# Patient Record
Sex: FEMALE | Race: WHITE | NOT HISPANIC OR LATINO | Employment: UNEMPLOYED | URBAN - METROPOLITAN AREA
[De-identification: names, ages, dates, MRNs, and addresses within clinical notes are randomized per-mention and may not be internally consistent; named-entity substitution may affect disease eponyms.]

---

## 2017-01-05 ENCOUNTER — OFFICE VISIT (OUTPATIENT)
Dept: CARDIOLOGY | Facility: CLINIC | Age: 57
End: 2017-01-05

## 2017-01-05 VITALS
WEIGHT: 250 LBS | DIASTOLIC BLOOD PRESSURE: 68 MMHG | HEIGHT: 66 IN | BODY MASS INDEX: 40.18 KG/M2 | HEART RATE: 72 BPM | SYSTOLIC BLOOD PRESSURE: 124 MMHG

## 2017-01-05 DIAGNOSIS — I10 ESSENTIAL HYPERTENSION: Primary | ICD-10-CM

## 2017-01-05 PROCEDURE — 99213 OFFICE O/P EST LOW 20 MIN: CPT | Performed by: INTERNAL MEDICINE

## 2017-01-05 PROCEDURE — 93000 ELECTROCARDIOGRAM COMPLETE: CPT | Performed by: INTERNAL MEDICINE

## 2017-01-05 NOTE — PROGRESS NOTES
" Subjective:       Liliya Robison is a 56 y.o. female who here for follow up    CC  Surg. Clearance for hand surgery  HPI  Pt with kn essntial arterial HTN here for surg. Clearance, denies cp, palpitation, syncope near syncope     Problem List Items Addressed This Visit        Cardiovascular and Mediastinum    Hypertension - Primary    Relevant Orders    ECG 12 Lead        Previous treatments/evaluations include: ASA and beta blocker. Cardiac risk factors: advanced age (older than 55 for men, 65 for women).    The following portions of the patient's history were reviewed and updated as appropriate: allergies, current medications, past family history, past medical history, past social history, past surgical history and problem list.    Past Medical History   Diagnosis Date   • Hyperlipidemia    • Hypertension    • NAFLD (nonalcoholic fatty liver disease)    • Obstructive sleep apnea on CPAP    • Type 2 diabetes mellitus     reports that she has quit smoking. She smoked 2.00 packs per day. She does not have any smokeless tobacco history on file. She reports that she does not drink alcohol.  Family History   Problem Relation Age of Onset   • Hypertension Mother    • Atrial fibrillation Father    • Kidney disease Father    • Hypertension Father    • Kidney disease Sister    • Hyperlipidemia Sister    • Hypertension Sister    • Hypertension Brother    • Hyperlipidemia Brother    • Diabetes Brother        Review of Systems  Constitutional: No wt loss, fever, fatigue  Gastrointestinal: No nausea, abdominal pain  Behavioral/Psych: No insomnia or anxiety   Cardiovascular no cp  Objective:       Physical Exam             Physical Exam  Visit Vitals   • /68   • Pulse 72   • Ht 66\" (167.6 cm)   • Wt 250 lb (113 kg)   • BMI 40.35 kg/m2     General appearance: alert, appears stated age and cooperative, oriented x 3  Neck: no JVD and supple, symmetrical, trachea midline  Lungs: clear to auscultation " bilaterally  Heart:Normal PMI,  S1, S2 normal, no murmur, rub or gallop  Extremities: normal range of motion, no cyanosis or edema,  Pulses: 2+ and symmetric  Skin: Skin color, texture, turgor normal. No rashes or lesions  Psych:  Pleasant and cooperative        Cardiographics  @  ECG 12 Lead  Date/Time: 1/5/2017 1:59 PM  Performed by: BROWN BLACKMAN  Authorized by: BROWN BLACKMAN   Comparison: compared with previous ECG   Similar to previous ECG  Rhythm: sinus rhythm  Clinical impression: non-specific ECG        · Findings consistent with a normal ECG stress test.  · Breast attenuation artifact is present.  · Left ventricular ejection fraction is normal (Calculated EF = 65%).  · Myocardial perfusion imaging indicates a normal myocardial perfusion study with no evidence of ischemia.  Impressions are consistent with a low risk study.        Echocardiogram:    · All left ventricular wall segments contract normally.  · Mild mitral valve regurgitation is present  · Left Ventricle: Calculated EF = 67.2%  · There is no evidence of pericardial effusion  · Mild mitral valve regurgitation is present        Current Outpatient Prescriptions:   •  albuterol (PROVENTIL HFA;VENTOLIN HFA) 108 (90 BASE) MCG/ACT inhaler, Inhale 2 puffs Every 6 (Six) Hours As Needed for shortness of air.  2 PUFFS EVERY 4 HOURS AS NEEDED, Disp: 2 inhaler, Rfl: 3  •  aspirin 81 MG EC tablet, Take 81 mg by mouth daily., Disp: , Rfl:   •  aspirin-acetaminophen-caffeine (EXCEDRIN MIGRAINE) 250-250-65 MG per tablet, Take 2 tablets by mouth Daily., Disp: 60 tablet, Rfl: 1  •  bisoprolol-hydrochlorothiazide (ZIAC) 5-6.25 MG per tablet, Take 1 tablet by mouth Daily., Disp: 90 tablet, Rfl: 3  •  buPROPion XL (WELLBUTRIN XL) 150 MG 24 hr tablet, Take 1 tablet by mouth Daily., Disp: 90 tablet, Rfl: 3  •  citalopram (CeleXA) 40 MG tablet, Take 1 tablet by mouth Daily., Disp: 30 tablet, Rfl: 0  •  Estradiol 0.75 MG/1.25 GM (0.06%) topical gel, Place   on the skin daily., Disp: , Rfl:   •  ezetimibe (ZETIA) 10 MG tablet, Take 1 tablet by mouth Daily., Disp: 90 tablet, Rfl: 1  •  fluticasone (FLONASE) 50 MCG/ACT nasal spray, 1 spray into each nostril 2 (Two) Times a Day., Disp: , Rfl:   •  furosemide (LASIX) 40 MG tablet, Take 1 tablet by mouth Daily., Disp: 90 tablet, Rfl: 3  •  glucose blood test strip, One touch verio Testing bs 4 x day, Disp: 400 each, Rfl: 2  •  insulin aspart (NOVOLOG FLEXPEN) 100 UNIT/ML solution pen-injector sc pen, IN JECT 35 UNITS IN THE MORNING 50 UNITS AT LUNCH AND 54 UNITS AT DINNER, Disp: 27 pen, Rfl: 2  •  Insulin Degludec 200 UNIT/ML solution pen-injector, Inject 68 Units under the skin 2 (Two) Times a Day., Disp: 9 pen, Rfl: 2  •  Insulin Pen Needle (PEN NEEDLES) 31G X 5 MM misc, 1 each 5 (Five) Times a Day., Disp: 500 each, Rfl: 2  •  lisinopril (PRINIVIL,ZESTRIL) 10 MG tablet, Take 1 tablet by mouth Daily., Disp: 90 tablet, Rfl: 3  •  montelukast (SINGULAIR) 10 MG tablet, Take 1 tablet by mouth Every Night., Disp: 30 tablet, Rfl: 11  •  Multiple Vitamins-Minerals (ALIVE ONCE DAILY WOMENS 50+ PO), Take 1 tablet/day by mouth daily., Disp: , Rfl:   •  naproxen (NAPROSYN) 500 MG tablet, Take 500 mg by mouth., Disp: , Rfl:   •  omeprazole (priLOSEC) 40 MG capsule, Take 1 capsule by mouth Daily., Disp: 90 capsule, Rfl: 3  •  ONETOUCH DELICA LANCETS 33G misc, USING 3 DAILY, Disp: 300 each, Rfl: 1  •  PHARMACIST CHOICE LANCETS misc, , Disp: , Rfl:   •  potassium chloride (KLOR-CON) 10 MEQ CR tablet, Take 1 tablet by mouth 2 (Two) Times a Day., Disp: 60 tablet, Rfl: 11  •  Probiotic Product (PROBIOTIC DAILY PO), Take 1 capsule by mouth daily., Disp: , Rfl:   •  progesterone (PROMETRIUM) 100 MG capsule, 100 mg. TAKE ONE CAPSULE BY MOUTH AT BEDTIME, Disp: , Rfl: 4  •  promethazine (PHENERGAN) 25 MG tablet, Take 1 tablet by mouth Every 6 (Six) Hours As Needed for nausea or vomiting., Disp: 360 tablet, Rfl: 3   Assessment:        Patient  Active Problem List   Diagnosis   • Hyperlipidemia   • Obstructive sleep apnea on CPAP   • NAFLD (nonalcoholic fatty liver disease)   • Type 2 diabetes mellitus without complication   • History of renal stone   • Cervical radiculopathy due to degenerative joint disease of spine   • Hypertension   • Gastroesophageal reflux disease without esophagitis   • Environmental allergies   • Chronic anxiety   • Chronic depression   • Migraine without status migrainosus, not intractable               Plan:       Clinically no signs and symptoms of angina or chf, no side effects with current meds,.    Continue current meds and treatment.        ICD-10-CM ICD-9-CM   1. Essential hypertension I10 401.9     Liliya Robison seen and examined with no clinical signs of angina or chf, pt is cleared for surgery with non modifiable risk factors.  Liliya Robison has been advised to take cardiac meds with sip of water on the day of surgery.    Please use beta blocker for tachycardia perioperatively    Anticoagulation to be managed appropriately    Watch for chest pain, shortness of breath, palpitations, arrhythmias, and significant change in the blood pressure perioperatively,     Please check EKG preop and postop if any questions, notify us if any change in patient's cardiovascular conditions    COUNSELING:    Liliya RobisonCounseling was given to patient for the following topics: diagnostic results, risk factor reductions, impressions, risks and benefits of treatment options and importance of treatment compliance .       SMOKING COUNSELING:    Counseling given: Not Answered      EMR Dragon/Transcription disclaimer:   Much of this encounter note is an electronic transcription/translation of spoken language to printed text. The electronic translation of spoken language may permit erroneous, or at times, nonsensical words or phrases to be inadvertently transcribed; Although I have reviewed the note for such errors, some may  still exist.

## 2017-01-05 NOTE — MR AVS SNAPSHOT
Liliya MALAVE Brayanalexandre   1/5/2017 1:15 PM   Office Visit    Dept Phone:  [phone]   Encounter #:  [id number]    Provider:  Luisa Grubbs MD   Department:  [facility]                Your Full Care Plan              Your Updated Medication List          This list is accurate as of: 1/5/17  2:18 PM.  Always use your most recent med list.                albuterol 108 (90 BASE) MCG/ACT inhaler   Commonly known as:  PROVENTIL HFA;VENTOLIN HFA   Inhale 2 puffs Every 6 (Six) Hours As Needed for shortness of air.  2 PUFFS EVERY 4 HOURS AS NEEDED       ALIVE ONCE DAILY WOMENS 50+ PO       aspirin 81 MG EC tablet       aspirin-acetaminophen-caffeine 250-250-65 MG per tablet   Commonly known as:  EXCEDRIN MIGRAINE   Take 2 tablets by mouth Daily.       bisoprolol-hydrochlorothiazide 5-6.25 MG per tablet   Commonly known as:  ZIAC   Take 1 tablet by mouth Daily.       buPROPion  MG 24 hr tablet   Commonly known as:  WELLBUTRIN XL   Take 1 tablet by mouth Daily.       citalopram 40 MG tablet   Commonly known as:  CeleXA   Take 1 tablet by mouth Daily.       Estradiol 0.75 MG/1.25 GM (0.06%) topical gel       ezetimibe 10 MG tablet   Commonly known as:  ZETIA   Take 1 tablet by mouth Daily.       fluticasone 50 MCG/ACT nasal spray   Commonly known as:  FLONASE       furosemide 40 MG tablet   Commonly known as:  LASIX   Take 1 tablet by mouth Daily.       glucose blood test strip   One touch verio Testing bs 4 x day       insulin aspart 100 UNIT/ML solution pen-injector sc pen   Commonly known as:  novoLOG FLEXPEN   IN JECT 35 UNITS IN THE MORNING 50 UNITS AT LUNCH AND 54 UNITS AT DINNER       Insulin Degludec 200 UNIT/ML solution pen-injector   Inject 68 Units under the skin 2 (Two) Times a Day.       lisinopril 10 MG tablet   Commonly known as:  PRINIVIL,ZESTRIL   Take 1 tablet by mouth Daily.       montelukast 10 MG tablet   Commonly known as:   SINGULAIR   Take 1 tablet by mouth Every Night.       naproxen 500 MG tablet   Commonly known as:  NAPROSYN       omeprazole 40 MG capsule   Commonly known as:  priLOSEC   Take 1 capsule by mouth Daily.       Pen Needles 31G X 5 MM misc   1 each 5 (Five) Times a Day.       * PHARMACIST CHOICE LANCETS misc       * ONETOUCH DELICA LANCETS 33G misc   USING 3 DAILY       potassium chloride 10 MEQ CR tablet   Commonly known as:  KLOR-CON   Take 1 tablet by mouth 2 (Two) Times a Day.       PROBIOTIC DAILY PO       progesterone 100 MG capsule   Commonly known as:  PROMETRIUM       promethazine 25 MG tablet   Commonly known as:  PHENERGAN   Take 1 tablet by mouth Every 6 (Six) Hours As Needed for nausea or vomiting.       * Notice:  This list has 2 medication(s) that are the same as other medications prescribed for you. Read the directions carefully, and ask your doctor or other care provider to review them with you.            We Performed the Following     ECG 12 Lead       You Were Diagnosed With        Codes Comments    Essential hypertension    -  Primary ICD-10-CM: I10  ICD-9-CM: 401.9       Instructions     None    Patient Instructions History      Upcoming Appointments     Visit Type Date Time Department    OFFICE VISIT 2017  1:15 PM [facility]    OFFICE VISIT 2017  2:00 PM [facility]    OFFICE VISIT 2017  9:00 AM [facility]      [employer] Signup     [facility] [employer] allows you to send messages to your doctor, view your test results, renew your prescriptions, schedule appointments, and more. To sign up, go to [employer] and click on the Sign Up Now link in the New User? box. Enter your [employer] Activation Code exactly as it appears below along with the last four digits of your Social Security Number and your Date of Birth () to complete the sign-up process. If you do not sign up before the expiration date, you must request a new code.    [employer] Activation  "Code: [email or web address]  Expires: 1/19/2017  9:24 AM    If you have questions, you can email [email or web address] or call [phone] to talk to our MyChart staff. Remember, MyChart is NOT to be used for urgent needs. For medical emergencies, dial 911.               Other Info from Your Visit           Your Appointments     Apr 20, 2017  2:00 PM EDT   Office Visit with Dave Cota MD   [facility] ENDOCRINOLOGY (--)    [address]  [facility] [address] [phone]           Arrive 15 minutes prior to appointment.            Jun 01, 2017  9:00 AM EDT   Office Visit with Davin Meyers Jr., MD   [facility] INTERNAL MEDICINE (--)    [address]  [facility] [address] [phone]           Arrive 15 minutes prior to appointment.              Allergies     Motrin Pm  [Ibuprofen-diphenhydramine Cit]  Other (See Comments)      Reason for Visit     Hypertension           Vital Signs     Blood Pressure Pulse Height Weight Body Mass Index Smoking Status    124/68 72 66\" (167.6 cm) 250 lb (113 kg) 40.35 kg/m2 Former Smoker      Problems and Diagnoses Noted     High blood pressure        "

## 2017-01-18 DIAGNOSIS — F41.9 CHRONIC ANXIETY: Primary | ICD-10-CM

## 2017-01-18 RX ORDER — CITALOPRAM 40 MG/1
TABLET ORAL
Qty: 30 TABLET | Refills: 0 | Status: SHIPPED | OUTPATIENT
Start: 2017-01-18 | End: 2017-02-21 | Stop reason: SDUPTHER

## 2017-02-21 DIAGNOSIS — F41.9 CHRONIC ANXIETY: ICD-10-CM

## 2017-02-21 RX ORDER — CITALOPRAM 40 MG/1
TABLET ORAL
Qty: 30 TABLET | Refills: 4 | Status: SHIPPED | OUTPATIENT
Start: 2017-02-21 | End: 2017-07-20 | Stop reason: SDUPTHER

## 2017-02-22 ENCOUNTER — TELEPHONE (OUTPATIENT)
Dept: INTERNAL MEDICINE | Facility: CLINIC | Age: 57
End: 2017-02-22

## 2017-02-22 NOTE — TELEPHONE ENCOUNTER
----- Message from Akilah Holland sent at 2/21/2017  1:22 PM EST -----  Contact: Patient  Patient called regarding her Rx for     Celexa.  States she needs a 90-day supply called to pharmacy.      Patient:  [phone]    Pharmacy:  [facility]/pharmacy #[id number] - [place], [place] - [address] AT [facility] - [phone]  - [phone] FX

## 2017-04-19 RX ORDER — BISOPROLOL FUMARATE AND HYDROCHLOROTHIAZIDE 5; 6.25 MG/1; MG/1
TABLET ORAL
Qty: 90 TABLET | Refills: 2 | Status: SHIPPED | OUTPATIENT
Start: 2017-04-19 | End: 2017-04-20

## 2017-04-20 ENCOUNTER — TELEPHONE (OUTPATIENT)
Dept: CARDIOLOGY | Facility: CLINIC | Age: 57
End: 2017-04-20

## 2017-04-20 RX ORDER — BISOPROLOL FUMARATE AND HYDROCHLOROTHIAZIDE 5; 6.25 MG/1; MG/1
1 TABLET ORAL DAILY
Qty: 90 TABLET | Refills: 3 | Status: SHIPPED | OUTPATIENT
Start: 2017-04-20 | End: 2018-08-03 | Stop reason: SDUPTHER

## 2017-04-20 NOTE — TELEPHONE ENCOUNTER
----- Message from Gloria Colbert sent at 4/19/2017 10:18 AM EDT -----  Regarding: Prescription Refill  Contact: [phone]  [facility] [phone]  [facility] 5/6.25 QD #90

## 2017-04-21 ENCOUNTER — TELEPHONE (OUTPATIENT)
Dept: INTERNAL MEDICINE | Facility: CLINIC | Age: 57
End: 2017-04-21

## 2017-04-21 DIAGNOSIS — E11.9 TYPE 2 DIABETES MELLITUS WITHOUT COMPLICATION, UNSPECIFIED LONG TERM INSULIN USE STATUS: Primary | ICD-10-CM

## 2017-04-21 NOTE — TELEPHONE ENCOUNTER
----- Message from Michelle Hubbard sent at 4/21/2017 10:27 AM EDT -----  Contact: Pt's  Bereket  Pt's  Bereket called to say they want to change to a new endocrinologist.  Dr. Cota hasn't be managing pt's diabetes well.  Only increasing the insulin not trying to get to root of problem. They would like to see Dr. Ted Edward and need a referral from Dr. Meyers.    DR. TED EDWARD  [address] [place] [address]  PH: [phone]       Pt's [phone] or [phone] cell

## 2017-05-08 ENCOUNTER — APPOINTMENT (OUTPATIENT)
Dept: WOMENS IMAGING | Facility: HOSPITAL | Age: 57
End: 2017-05-08

## 2017-05-08 PROCEDURE — 77067 SCR MAMMO BI INCL CAD: CPT | Performed by: RADIOLOGY

## 2017-06-09 DIAGNOSIS — G47.33 OBSTRUCTIVE SLEEP APNEA ON CPAP: ICD-10-CM

## 2017-06-09 DIAGNOSIS — E78.5 HYPERLIPIDEMIA: ICD-10-CM

## 2017-06-09 DIAGNOSIS — E11.9 TYPE 2 DIABETES MELLITUS WITHOUT COMPLICATION (HCC): ICD-10-CM

## 2017-06-09 DIAGNOSIS — Z99.89 OBSTRUCTIVE SLEEP APNEA ON CPAP: ICD-10-CM

## 2017-06-09 DIAGNOSIS — K76.0 NAFLD (NONALCOHOLIC FATTY LIVER DISEASE): ICD-10-CM

## 2017-06-13 DIAGNOSIS — E78.5 HYPERLIPIDEMIA: ICD-10-CM

## 2017-06-13 DIAGNOSIS — G47.33 OBSTRUCTIVE SLEEP APNEA ON CPAP: ICD-10-CM

## 2017-06-13 DIAGNOSIS — E11.9 TYPE 2 DIABETES MELLITUS WITHOUT COMPLICATION (HCC): ICD-10-CM

## 2017-06-13 DIAGNOSIS — K76.0 NAFLD (NONALCOHOLIC FATTY LIVER DISEASE): ICD-10-CM

## 2017-06-13 DIAGNOSIS — Z99.89 OBSTRUCTIVE SLEEP APNEA ON CPAP: ICD-10-CM

## 2017-07-20 DIAGNOSIS — F41.9 CHRONIC ANXIETY: ICD-10-CM

## 2017-07-20 RX ORDER — CITALOPRAM 40 MG/1
TABLET ORAL
Qty: 30 TABLET | Refills: 4 | Status: SHIPPED | OUTPATIENT
Start: 2017-07-20 | End: 2017-12-23 | Stop reason: SDUPTHER

## 2017-07-28 ENCOUNTER — OFFICE VISIT (OUTPATIENT)
Dept: INTERNAL MEDICINE | Facility: CLINIC | Age: 57
End: 2017-07-28

## 2017-07-28 VITALS
DIASTOLIC BLOOD PRESSURE: 66 MMHG | WEIGHT: 251 LBS | HEART RATE: 70 BPM | BODY MASS INDEX: 40.34 KG/M2 | HEIGHT: 66 IN | SYSTOLIC BLOOD PRESSURE: 110 MMHG

## 2017-07-28 DIAGNOSIS — G43.809 OTHER MIGRAINE WITHOUT STATUS MIGRAINOSUS, NOT INTRACTABLE: ICD-10-CM

## 2017-07-28 DIAGNOSIS — F32.A CHRONIC DEPRESSION: ICD-10-CM

## 2017-07-28 DIAGNOSIS — F41.9 CHRONIC ANXIETY: ICD-10-CM

## 2017-07-28 DIAGNOSIS — I10 ESSENTIAL HYPERTENSION: ICD-10-CM

## 2017-07-28 DIAGNOSIS — E78.5 HYPERLIPIDEMIA, UNSPECIFIED HYPERLIPIDEMIA TYPE: Primary | ICD-10-CM

## 2017-07-28 DIAGNOSIS — K21.9 GASTROESOPHAGEAL REFLUX DISEASE WITHOUT ESOPHAGITIS: ICD-10-CM

## 2017-07-28 DIAGNOSIS — R10.11 RIGHT UPPER QUADRANT ABDOMINAL PAIN: ICD-10-CM

## 2017-07-28 PROCEDURE — 99214 OFFICE O/P EST MOD 30 MIN: CPT | Performed by: INTERNAL MEDICINE

## 2017-07-28 RX ORDER — METFORMIN HYDROCHLORIDE 500 MG/1
TABLET, EXTENDED RELEASE ORAL
COMMUNITY
Start: 2017-07-24

## 2017-07-28 NOTE — PROGRESS NOTES
Subjective   Liliya Robison is a 56 y.o. female.     History of Present Illness she is here today for her annual visit which includes follow-up of hypertension, hypercholesterolemia, migraine headache, neck anxiety and depression, and persistent right upper quadrant pain.  She relates pain on a daily basis on an intermittent basis.  Usually it is brief and it is not associated with eating or defecation or urination.  She does have some low back pain on the right side as well.  It does not awaken her from sleep.  She has chronic nausea without vomiting which at times is worse with this pain.  There has been no change in bowel habit, dysphagia, melanotic stool, or rectal bleeding.    From a respiratory standpoint she has been doing fairly well.  She has occasional wheezing decreased with inhaler use.  She has chronic left-sided migraine on a daily basis although fortunately they have not been disabling.  There are no associated neurologic symptoms.  The remainder of her review systems is negative.        Review of Systems   Constitutional: Negative for activity change, appetite change, fatigue and fever.   HENT: Negative for trouble swallowing.    Respiratory: Positive for wheezing. Negative for cough, chest tightness and shortness of breath.    Cardiovascular: Negative for chest pain, palpitations and leg swelling.   Gastrointestinal: Positive for abdominal pain. Negative for anal bleeding, blood in stool, constipation, diarrhea, nausea and vomiting.   Genitourinary: Negative for difficulty urinating, dysuria, flank pain, frequency and hematuria.   Musculoskeletal: Positive for back pain. Negative for arthralgias and gait problem.   Neurological: Positive for headaches. Negative for dizziness, syncope, facial asymmetry, speech difficulty, weakness and numbness.   Psychiatric/Behavioral: Negative for confusion and dysphoric mood. The patient is not nervous/anxious.        Objective   Physical Exam    Constitutional: She is oriented to person, place, and time. Vital signs are normal. She appears well-developed and well-nourished. She is active. She does not appear ill.   Eyes: Conjunctivae are normal.   Fundoscopic exam:       The right eye shows no AV nicking, no exudate and no hemorrhage.        The left eye shows no AV nicking, no exudate and no hemorrhage.   Neck: Carotid bruit is not present. No thyroid mass and no thyromegaly present.   Cardiovascular: Normal rate, regular rhythm, S1 normal and S2 normal.  Exam reveals no S3 and no S4.    No murmur heard.  Pulses:       Posterior tibial pulses are 2+ on the right side, and 2+ on the left side.   Pulmonary/Chest: No tachypnea. No respiratory distress. She has no decreased breath sounds. She has no wheezes. She has no rhonchi. She has no rales.   Abdominal: Soft. Normal appearance and bowel sounds are normal. She exhibits no abdominal bruit and no mass. There is tenderness in the right upper quadrant.       Vascular Status -  Her exam exhibits no right foot edema. Her exam exhibits no left foot edema.  Neurological: She is alert and oriented to person, place, and time. Gait normal.   Reflex Scores:       Bicep reflexes are 2+ on the right side.  Psychiatric: She has a normal mood and affect. Her speech is normal and behavior is normal. Judgment and thought content normal. Cognition and memory are normal.       Assessment/Plan December 2016 lab showed a hemoglobin A1c of 9.6.  CMP was normal with exception of an elevated glucose.  Urinalysis was normal.  CBC showed a platelet count of 134,000.  Total cholesterol 193 triglycerides 153 HDL cholesteryl 41 LDL cholesterol 121    Assessment #1 hypertension-good control #2 chronic anxiety and depression-nicely compensated at present #3 hypercholesterolemia-fair control and without sign of target organ injury #4 right upper quadrant pain-question related to gallbladder disease.  She did have a normal abdominal  ultrasound in 2014.    Plan #1 no change medication #2 abdominal ultrasound #3 CBC, CMP, urinalysis.  Routine follow-up with me in 6 months.

## 2017-07-31 RX ORDER — EZETIMIBE 10 MG/1
TABLET ORAL
Qty: 90 TABLET | Refills: 1 | Status: SHIPPED | OUTPATIENT
Start: 2017-07-31 | End: 2018-01-26 | Stop reason: SDUPTHER

## 2017-08-11 ENCOUNTER — HOSPITAL ENCOUNTER (OUTPATIENT)
Dept: ULTRASOUND IMAGING | Facility: HOSPITAL | Age: 57
Discharge: HOME | End: 2017-08-11
Attending: INTERNAL MEDICINE | Admitting: INTERNAL MEDICINE

## 2017-08-11 PROCEDURE — 76700 US EXAM ABDOM COMPLETE: CPT

## 2017-08-16 ENCOUNTER — TELEPHONE (OUTPATIENT)
Dept: INTERNAL MEDICINE | Facility: CLINIC | Age: 57
End: 2017-08-16

## 2017-08-16 NOTE — TELEPHONE ENCOUNTER
----- Message from Jigna Jarrett MA sent at 8/16/2017  1:27 PM EDT -----  Dr Meyers Pt    Pt calling for US results-very anxious    Pt#[phone]

## 2017-08-16 NOTE — TELEPHONE ENCOUNTER
US showed extra fat deposits in the liver, previously seen on the US that she had in 2015. Nothing to explain the pain in the right side. AS for the fatty liver, weight loss will be helpful.

## 2017-10-20 RX ORDER — MONTELUKAST SODIUM 10 MG/1
TABLET ORAL
Qty: 30 TABLET | Refills: 4 | Status: SHIPPED | OUTPATIENT
Start: 2017-10-20 | End: 2018-03-27 | Stop reason: SDUPTHER

## 2017-11-29 ENCOUNTER — APPOINTMENT (OUTPATIENT)
Dept: GENERAL RADIOLOGY | Facility: HOSPITAL | Age: 57
End: 2017-11-29

## 2017-11-29 ENCOUNTER — HOSPITAL ENCOUNTER (EMERGENCY)
Facility: HOSPITAL | Age: 57
Discharge: HOME | End: 2017-11-29
Attending: FAMILY MEDICINE | Admitting: FAMILY MEDICINE

## 2017-11-29 VITALS
BODY MASS INDEX: 38.57 KG/M2 | DIASTOLIC BLOOD PRESSURE: 67 MMHG | RESPIRATION RATE: 18 BRPM | HEIGHT: 66 IN | TEMPERATURE: 98.7 F | OXYGEN SATURATION: 94 % | WEIGHT: 240 LBS | SYSTOLIC BLOOD PRESSURE: 131 MMHG | HEART RATE: 76 BPM
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
## 2017-11-29 DIAGNOSIS — M25.531 BILATERAL WRIST PAIN: Primary | ICD-10-CM

## 2017-11-29 DIAGNOSIS — S16.1XXA CERVICAL STRAIN, ACUTE, INITIAL ENCOUNTER: ICD-10-CM

## 2017-11-29 DIAGNOSIS — S80.01XA CONTUSION OF RIGHT KNEE, INITIAL ENCOUNTER: ICD-10-CM

## 2017-11-29 DIAGNOSIS — S80.02XA CONTUSION OF LEFT KNEE, INITIAL ENCOUNTER: ICD-10-CM

## 2017-11-29 DIAGNOSIS — M25.532 BILATERAL WRIST PAIN: Primary | ICD-10-CM
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
## 2017-11-29 PROCEDURE — 73110 X-RAY EXAM OF WRIST: CPT

## 2017-11-29 PROCEDURE — 99284 EMERGENCY DEPT VISIT MOD MDM: CPT

## 2017-11-29 PROCEDURE — 99283 EMERGENCY DEPT VISIT LOW MDM: CPT

## 2017-11-29 PROCEDURE — 73560 X-RAY EXAM OF KNEE 1 OR 2: CPT

## 2017-11-29 RX ORDER — CYCLOBENZAPRINE HCL 10 MG
10 TABLET ORAL 3 TIMES DAILY PRN
Qty: 20 TABLET | Refills: 0 | Status: SHIPPED | OUTPATIENT
Start: 2017-11-29

## 2017-11-29 RX ORDER — ONDANSETRON 4 MG/1
4 TABLET, ORALLY DISINTEGRATING ORAL ONCE
Status: COMPLETED | OUTPATIENT
Start: 2017-11-29 | End: 2017-11-29

## 2017-11-29 RX ORDER — ONDANSETRON 2 MG/ML
4 INJECTION INTRAMUSCULAR; INTRAVENOUS ONCE
Status: DISCONTINUED | OUTPATIENT
Start: 2017-11-29 | End: 2017-11-29

## 2017-11-29 RX ORDER — NAPROXEN 500 MG/1
500 TABLET ORAL 2 TIMES DAILY WITH MEALS
Qty: 30 TABLET | Refills: 0 | Status: SHIPPED | OUTPATIENT
Start: 2017-11-29

## 2017-11-29 RX ORDER — HYDROCODONE BITARTRATE AND ACETAMINOPHEN 7.5; 325 MG/1; MG/1
1 TABLET ORAL ONCE
Status: COMPLETED | OUTPATIENT
Start: 2017-11-29 | End: 2017-11-29

## 2017-11-29 RX ADMIN — HYDROCODONE BITARTRATE AND ACETAMINOPHEN 1 TABLET: 7.5; 325 TABLET ORAL at 18:40

## 2017-11-29 RX ADMIN — ONDANSETRON 4 MG: 4 TABLET, ORALLY DISINTEGRATING ORAL at 18:40

## 2017-12-19 RX ORDER — FUROSEMIDE 40 MG/1
TABLET ORAL
Qty: 90 TABLET | Refills: 3 | Status: SHIPPED | OUTPATIENT
Start: 2017-12-19

## 2017-12-19 RX ORDER — OMEPRAZOLE 40 MG/1
CAPSULE, DELAYED RELEASE ORAL
Qty: 90 CAPSULE | Refills: 3 | Status: SHIPPED | OUTPATIENT
Start: 2017-12-19 | End: 2019-08-20 | Stop reason: ALTCHOICE

## 2017-12-23 DIAGNOSIS — F41.9 CHRONIC ANXIETY: ICD-10-CM

## 2017-12-26 RX ORDER — CITALOPRAM 40 MG/1
TABLET ORAL
Qty: 90 TABLET | Refills: 3 | Status: SHIPPED | OUTPATIENT
Start: 2017-12-26

## 2017-12-29 ENCOUNTER — OFFICE VISIT (OUTPATIENT)
Dept: INTERNAL MEDICINE | Facility: CLINIC | Age: 57
End: 2017-12-29

## 2017-12-29 ENCOUNTER — TELEPHONE (OUTPATIENT)
Dept: INTERNAL MEDICINE | Facility: CLINIC | Age: 57
End: 2017-12-29

## 2017-12-29 VITALS
HEART RATE: 70 BPM | BODY MASS INDEX: 40.34 KG/M2 | DIASTOLIC BLOOD PRESSURE: 74 MMHG | OXYGEN SATURATION: 97 % | HEIGHT: 66 IN | WEIGHT: 251 LBS | SYSTOLIC BLOOD PRESSURE: 120 MMHG

## 2017-12-29 DIAGNOSIS — J20.8 ACUTE BRONCHITIS DUE TO OTHER SPECIFIED ORGANISMS: Primary | ICD-10-CM

## 2017-12-29 PROBLEM — R10.11 RIGHT UPPER QUADRANT ABDOMINAL PAIN: Status: RESOLVED | Noted: 2017-07-28 | Resolved: 2017-12-29

## 2017-12-29 PROCEDURE — 99212 OFFICE O/P EST SF 10 MIN: CPT | Performed by: INTERNAL MEDICINE

## 2017-12-29 RX ORDER — DEXTROMETHORPHAN HYDROBROMIDE AND PROMETHAZINE HYDROCHLORIDE 15; 6.25 MG/5ML; MG/5ML
5 SYRUP ORAL EVERY 4 HOURS
Qty: 240 ML | Refills: 1 | Status: SHIPPED | OUTPATIENT
Start: 2017-12-29 | End: 2018-05-23

## 2017-12-29 RX ORDER — AZITHROMYCIN 250 MG/1
TABLET, FILM COATED ORAL
Qty: 6 TABLET | Refills: 0 | Status: SHIPPED | OUTPATIENT
Start: 2017-12-29 | End: 2018-05-23

## 2017-12-29 NOTE — PROGRESS NOTES
Subjective   Liliya Robison is a 57 y.o. female.     History of Present Illness she is here today with a week history of significant cough day and night with green gray and yellow sputum production.  Occasionally she has had some wheezing and dyspnea.  She has had some rhinorrhea and hoarseness.  She denies sore throat fever sweats or chills.  She does not smoke.        Review of Systems   Constitutional: Negative for activity change, appetite change, chills, diaphoresis, fatigue and fever.   HENT: Positive for rhinorrhea. Negative for ear pain, sinus pressure, sneezing, sore throat, tinnitus, trouble swallowing and voice change.    Respiratory: Positive for cough, shortness of breath and wheezing. Negative for chest tightness.    Cardiovascular: Negative for chest pain, palpitations and leg swelling.   Gastrointestinal: Negative for abdominal pain, diarrhea and nausea.   Neurological: Negative for dizziness, weakness and headaches.       Objective   Physical Exam   Constitutional: She is oriented to person, place, and time. Vital signs are normal. She appears well-developed and well-nourished. She is active. She does not appear ill.   HENT:   Right Ear: Tympanic membrane and ear canal normal.   Left Ear: External ear and ear canal normal.   Ears:    Eyes: Conjunctivae are normal.   Cardiovascular: Normal rate, regular rhythm, S1 normal and S2 normal.  Exam reveals no S3 and no S4.    No murmur heard.  Pulmonary/Chest: No tachypnea. No respiratory distress. She has no decreased breath sounds. She has no wheezes. She has no rhonchi. She has no rales.   Abdominal: Soft. Normal appearance and bowel sounds are normal. She exhibits no abdominal bruit and no mass. There is no hepatosplenomegaly.       Vascular Status -  Her exam exhibits no right foot edema. Her exam exhibits no left foot edema.  Neurological: She is alert and oriented to person, place, and time. Gait normal.   Psychiatric: She has a normal mood and  affect. Her speech is normal and behavior is normal. Judgment and thought content normal. Cognition and memory are normal.       Assessment/Plan assessment #1 acute bronchitis    Plan #1 continue to push by mouth fluids #2 Z-Angel 5 day #3 Phenergan DM 1 teaspoon every 4 hours when necessary cough.  She will call if symptoms do not resolve.

## 2017-12-29 NOTE — TELEPHONE ENCOUNTER
----- Message from Jigna Jarrett MA sent at 12/29/2017  2:44 PM EST -----  Pt requests 2 Diflucan for abx therapy    [facility] # [phone]

## 2018-01-02 ENCOUNTER — TELEPHONE (OUTPATIENT)
Dept: INTERNAL MEDICINE | Facility: CLINIC | Age: 58
End: 2018-01-02

## 2018-01-02 RX ORDER — PROMETHAZINE HYDROCHLORIDE 25 MG/1
25 TABLET ORAL EVERY 6 HOURS PRN
Qty: 40 TABLET | Refills: 0 | Status: SHIPPED | OUTPATIENT
Start: 2018-01-02

## 2018-01-02 RX ORDER — FLUCONAZOLE 150 MG/1
TABLET ORAL
Qty: 2 TABLET | Refills: 1 | Status: SHIPPED | OUTPATIENT
Start: 2018-01-02 | End: 2018-05-23 | Stop reason: SDUPTHER

## 2018-01-02 NOTE — TELEPHONE ENCOUNTER
"----- Message from Sherrell Olivera sent at 1/2/2018 10:45 AM EST -----  Contact: Bereket pt   Pt would like a refill for  promethazine (PHENERGAN) 25 MG tablet  Diflucan- states pt gets every time and I didn't see it in chart.     \"was suppose to have it at the visit on Friday but wasn't called in.\"    [facility]/pharmacy #[id number] - [place], [place] - [address] AT [facility] - [phone]  - [phone]     Ph# [phone]      "

## 2018-01-15 DIAGNOSIS — R49.0 HOARSENESS: Primary | ICD-10-CM

## 2018-01-15 RX ORDER — LISINOPRIL 10 MG/1
TABLET ORAL
Qty: 90 TABLET | Refills: 2 | Status: SHIPPED | OUTPATIENT
Start: 2018-01-15

## 2018-01-26 RX ORDER — BUPROPION HYDROCHLORIDE 150 MG/1
TABLET ORAL
Qty: 90 TABLET | Refills: 3 | Status: SHIPPED | OUTPATIENT
Start: 2018-01-26 | End: 2019-08-20

## 2018-01-26 RX ORDER — EZETIMIBE 10 MG/1
TABLET ORAL
Qty: 90 TABLET | Refills: 1 | Status: SHIPPED | OUTPATIENT
Start: 2018-01-26

## 2018-03-28 RX ORDER — MONTELUKAST SODIUM 10 MG/1
TABLET ORAL
Qty: 30 TABLET | Refills: 4 | Status: SHIPPED | OUTPATIENT
Start: 2018-03-28 | End: 2018-05-23 | Stop reason: SDUPTHER

## 2018-04-16 RX ORDER — ALBUTEROL SULFATE 90 MCG
2 HFA AEROSOL WITH ADAPTER (GRAM) INHALATION EVERY 6 HOURS PRN
Qty: 13.4 INHALER | Refills: 3 | Status: SHIPPED | OUTPATIENT
Start: 2018-04-16

## 2018-05-22 ENCOUNTER — TELEPHONE (OUTPATIENT)
Dept: INTERNAL MEDICINE | Facility: CLINIC | Age: 58
End: 2018-05-22

## 2018-05-22 NOTE — TELEPHONE ENCOUNTER
Notified patient to please come to the acute clinic tomorrow morning at 8am but if face/neck become more swollen or any other symptoms she needs to report to the ER

## 2018-05-22 NOTE — TELEPHONE ENCOUNTER
----- Message from Berna Caputo sent at 5/22/2018  1:41 PM EDT -----  Contact: Spouse - Dr Meyers's pt - RE: APPT This Week  Spouse calling and would like to make pt appt for swollen gland on (L) side of face, poss infection. Could you please call pt to work pt in this week. Can pt poss see a NP? Please advise. Thanks    Call pt [phone]

## 2018-05-23 ENCOUNTER — OFFICE VISIT (OUTPATIENT)
Dept: INTERNAL MEDICINE | Facility: CLINIC | Age: 58
End: 2018-05-23

## 2018-05-23 VITALS
BODY MASS INDEX: 40.66 KG/M2 | TEMPERATURE: 99.3 F | DIASTOLIC BLOOD PRESSURE: 72 MMHG | RESPIRATION RATE: 14 BRPM | WEIGHT: 253 LBS | HEIGHT: 66 IN | SYSTOLIC BLOOD PRESSURE: 112 MMHG

## 2018-05-23 DIAGNOSIS — K11.20 PAROTITIS: Primary | ICD-10-CM

## 2018-05-23 PROBLEM — G56.00 CARPAL TUNNEL SYNDROME: Status: ACTIVE | Noted: 2018-05-23

## 2018-05-23 PROBLEM — K58.9 IRRITABLE BOWEL SYNDROME: Status: ACTIVE | Noted: 2018-05-23

## 2018-05-23 PROBLEM — M47.812 OSTEOARTHRITIS OF CERVICAL SPINE: Status: ACTIVE | Noted: 2018-05-23

## 2018-05-23 PROCEDURE — 99213 OFFICE O/P EST LOW 20 MIN: CPT | Performed by: NURSE PRACTITIONER

## 2018-05-23 RX ORDER — AMOXICILLIN AND CLAVULANATE POTASSIUM 875; 125 MG/1; MG/1
1 TABLET, FILM COATED ORAL EVERY 12 HOURS SCHEDULED
Qty: 14 TABLET | Refills: 0 | Status: SHIPPED | OUTPATIENT
Start: 2018-05-23 | End: 2018-05-30 | Stop reason: SDUPTHER

## 2018-05-23 RX ORDER — FLUCONAZOLE 150 MG/1
TABLET ORAL
Qty: 2 TABLET | Refills: 1 | Status: SHIPPED | OUTPATIENT
Start: 2018-05-23 | End: 2019-08-20

## 2018-05-23 NOTE — PROGRESS NOTES
Subjective   Liliya Robison is a 57 y.o. female.     She reports last week she had pain in her neck on the right side for several days, then she started with pain on the left side of neck. The pain gets worst with standing up and there is discomfort as the day goes on. She did place ear drops in the ear thinking it was an ear infection despite no ear pain, but that hasn't helped.       Neck Pain    This is a new problem. The problem occurs constantly. The problem has been gradually worsening. The pain is associated with nothing. The pain is present in the left side. The quality of the pain is described as stabbing. The pain is at a severity of 7/10 (worst 10/10). The pain is moderate. Exacerbated by: standing,or lying against  Associated symptoms include headaches (chronic ). Pertinent negatives include no chest pain, fever, numbness, pain with swallowing, photophobia, syncope, tingling, trouble swallowing or visual change. Associated symptoms comments: Lightheadedness . She has tried muscle relaxants for the symptoms. The treatment provided no relief.        The following portions of the patient's history were reviewed and updated as appropriate: allergies, current medications and problem list.    Review of Systems   Constitutional: Negative for chills, fatigue and fever.   HENT: Negative for ear discharge, ear pain and trouble swallowing.    Eyes: Negative for photophobia.   Respiratory: Negative for cough, shortness of breath and wheezing.    Cardiovascular: Negative for chest pain, leg swelling and syncope.   Musculoskeletal: Positive for neck pain (left side of neck). Negative for neck stiffness.   Neurological: Positive for headaches (chronic ). Negative for dizziness, tingling, speech difficulty and numbness.       Objective   Physical Exam   Constitutional: She is oriented to person, place, and time. She appears well-developed and well-nourished. She is cooperative. She does not have a sickly  appearance. She does not appear ill.   HENT:   Head: Normocephalic.   Right Ear: Hearing, tympanic membrane and external ear normal. No drainage, swelling or tenderness. No mastoid tenderness. Tympanic membrane is not injected, not scarred, not erythematous and not bulging. Tympanic membrane mobility is normal. No middle ear effusion. No decreased hearing is noted.   Left Ear: Hearing, tympanic membrane and external ear normal.   Nose: Nose normal. No mucosal edema, rhinorrhea, sinus tenderness or nasal deformity. Right sinus exhibits no maxillary sinus tenderness and no frontal sinus tenderness. Left sinus exhibits no maxillary sinus tenderness and no frontal sinus tenderness.   Mouth/Throat: Oropharynx is clear and moist and mucous membranes are normal. Normal dentition.   Eyes: Conjunctivae and lids are normal. Pupils are equal, round, and reactive to light. Right eye exhibits no discharge and no exudate. Left eye exhibits no discharge and no exudate.   Neck: Trachea normal, normal range of motion and full passive range of motion without pain. Carotid bruit is not present. No edema present. No thyroid mass and no thyromegaly present.   Cardiovascular: Regular rhythm, normal heart sounds and normal pulses.  Exam reveals no S3 and no S4.    No murmur heard.  Pulmonary/Chest: Effort normal and breath sounds normal. No respiratory distress. She has no decreased breath sounds. She has no wheezes. She has no rhonchi. She has no rales.   Musculoskeletal: She exhibits no edema.   Lymphadenopathy:        Head (right side): No submental, no submandibular, no tonsillar, no preauricular, no posterior auricular and no occipital adenopathy present.        Head (left side): No submental, no submandibular, no tonsillar, no preauricular, no posterior auricular and no occipital adenopathy present.   Left parotid tenderness with palpation    Neurological: She is alert and oriented to person, place, and time. Gait normal.   Skin:  Skin is warm, dry and intact. No cyanosis. Nails show no clubbing.   Psychiatric: She has a normal mood and affect.       Assessment/Plan   Liliya was seen today for neck pain.    Diagnoses and all orders for this visit:    Parotitis  Comments:  increase water intake, eat lemon drops/slices   Orders:  -     amoxicillin-clavulanate (AUGMENTIN) 875-125 MG per tablet; Take 1 tablet by mouth Every 12 (Twelve) Hours for 7 days.  -     fluconazole (DIFLUCAN) 150 MG tablet; Take one pill once, if needed repeat in 4 days.    If symptoms persist will refer to ENT.

## 2018-05-23 NOTE — PATIENT INSTRUCTIONS
Parotitis  Parotitis is irritation and swelling (inflammation) of one or both of your parotid glands. These glands produce saliva. They are found on each side of your face, below and in front of your earlobes. The saliva that they produce comes out of tiny openings (ducts) inside your cheeks.  Parotitis may cause sudden swelling and pain (acute parotitis). It can also cause repeated episodes of swelling and pain or continued swelling that may or may not be painful (chronic parotitis).  What are the causes?  Causes of this condition include:  · Bacterial infections.  · Viral infections, such as mumps and HIV.  · Blockage (obstruction) of saliva flow through the parotid glands. This can be from a stone, scar tissue, or tumor.  · Diseases that cause your body's defense system to attack salivary glands and cause inflammation (autoimmune diseases).  What increases the risk?  This condition is more likely to develop in:  · People who are 50 or older.  · People who do not drink enough fluids (dehydration).  · People who drink too much alcohol.  · People who have a dry mouth.  · People who have poor dental hygiene.  · People who have diabetes.  · People who have gout.  · People who have a long-term illness.  · People who have had X-ray treatments to the head and neck.  · People who take certain medicines.  What are the signs or symptoms?  Symptoms of this condition depend on the cause. Symptoms may include:  · Swelling under and in front of the ear. This may get worse after eating.  · Redness of the skin over the parotid gland.  · Pain and tenderness over the parotid gland. This may get worse after eating.  · Fever or chills.  · Pus coming from the ducts inside the mouth.  · Dry mouth.  · A bad taste in the mouth.  How is this diagnosed?  This condition is diagnosed with a medical history and physical exam. You may also have tests to find the cause of parotitis. These tests may include:  · Doing blood tests to check for  autoimmune disease or a viral infection.  · Taking a sample of fluid from the parotid gland to test for infection.  · Injecting the ducts of the gland with a dye before taking X-rays (sialogram).  · Having other imaging studies of the gland, including X-rays, ultrasound, MRI, or CT scan.  · Checking the opening of the gland for a stone or obstruction.  · Placing a needle into the gland to remove tissue for a biopsy (fine needle aspiration).  How is this treated?  Treatment for this condition depends on the cause. Treatment may include:  · Antibiotic medicine for a bacterial infection.  · Drinking more fluids.  · Removing a stone or obstruction.  · Treating an underlying disease that is causing parotitis.  · Surgery to drain an infection, remove a growth, or remove the whole gland (parotidectomy).  Treatment may not be needed if parotid swelling goes away with home care.  Follow these instructions at home:  Medicines   · Take over-the-counter and prescription medicines only as told by your health care provider.  · If you were prescribed an antibiotic, take it as told by your health care provider. Do not stop taking the antibiotic even if you start to feel better.  Managing pain and swelling   · Apply warm compresses to the affected area as told by your health care provider.  · Gently massage the parotid glands as told by your health care provider.  General instructions     · Drink enough fluid to keep your urine clear or pale yellow.  · Try sucking on sour candy. This may help to make your mouth less dry and may stimulate the flow of saliva.  · Keep your mouth clean and moist. Gargle with a salt-water mixture 3-4 times per day, or as needed. To make a salt-water mixture, completely dissolve ½-1 tsp of salt in 1 cup of warm water.  · Maintain good oral health.  ¨ Brush your teeth at least two times per day.  ¨ Floss your teeth every day.  ¨ See your dentist regularly.  · Do not use tobacco products, including  cigarettes, chewing tobacco, or e-cigarettes. If you need help quitting, ask your health care provider.  · Keep all follow-up visits as told by your health care provider. This is important.  Contact a health care provider if:  · You have a fever or chills.  · You have new symptoms.  · Your symptoms get worse.  · Your symptoms do not improve with treatment.  This information is not intended to replace advice given to you by your health care provider. Make sure you discuss any questions you have with your health care provider.  Document Released: 06/09/2003 Document Revised: 05/25/2017 Document Reviewed: 05/12/2016  [employer] Interactive Patient Education © 2017 [employer]

## 2018-05-25 ENCOUNTER — APPOINTMENT (OUTPATIENT)
Dept: WOMENS IMAGING | Facility: HOSPITAL | Age: 58
End: 2018-05-25

## 2018-05-25 PROCEDURE — 77067 SCR MAMMO BI INCL CAD: CPT | Performed by: RADIOLOGY

## 2018-05-25 PROCEDURE — 77063 BREAST TOMOSYNTHESIS BI: CPT | Performed by: RADIOLOGY

## 2018-05-30 ENCOUNTER — OFFICE VISIT (OUTPATIENT)
Dept: INTERNAL MEDICINE | Facility: CLINIC | Age: 58
End: 2018-05-30

## 2018-05-30 VITALS
OXYGEN SATURATION: 96 % | DIASTOLIC BLOOD PRESSURE: 72 MMHG | BODY MASS INDEX: 40.34 KG/M2 | SYSTOLIC BLOOD PRESSURE: 122 MMHG | WEIGHT: 251 LBS | HEART RATE: 73 BPM | HEIGHT: 66 IN

## 2018-05-30 DIAGNOSIS — K11.20 PAROTIDITIS: Primary | ICD-10-CM

## 2018-05-30 DIAGNOSIS — K11.20 PAROTITIS: ICD-10-CM

## 2018-05-30 PROBLEM — J20.8 ACUTE BRONCHITIS DUE TO OTHER SPECIFIED ORGANISMS: Status: RESOLVED | Noted: 2017-12-29 | Resolved: 2018-05-30

## 2018-05-30 PROBLEM — M47.812 OSTEOARTHRITIS OF CERVICAL SPINE: Status: RESOLVED | Noted: 2018-05-23 | Resolved: 2018-05-30

## 2018-05-30 PROCEDURE — 99213 OFFICE O/P EST LOW 20 MIN: CPT | Performed by: INTERNAL MEDICINE

## 2018-05-30 RX ORDER — AMOXICILLIN AND CLAVULANATE POTASSIUM 875; 125 MG/1; MG/1
1 TABLET, FILM COATED ORAL 2 TIMES DAILY
Qty: 6 TABLET | Refills: 0 | Status: SHIPPED | OUTPATIENT
Start: 2018-05-30 | End: 2018-06-02

## 2018-05-30 NOTE — PROGRESS NOTES
Subjective   Liliya Robison is a 57 y.o. female.     History of Present Illness she is here today for follow-up of acute left parotiditis-first occurrence.  After nearly 7 days of Augmentin the swelling and pain have essentially resolved.  She denies any fever sweats or chills.  There has been no sore throat or ear pain.        Review of Systems   Constitutional: Negative for activity change, appetite change, chills, diaphoresis and fever.   HENT: Negative for ear pain, sore throat, trouble swallowing and voice change.    Respiratory: Negative for cough, shortness of breath and wheezing.    Gastrointestinal: Negative for abdominal pain, diarrhea, nausea and vomiting.   Neurological: Negative for dizziness, weakness and headache.       Objective   Physical Exam   Constitutional: She is oriented to person, place, and time. Vital signs are normal. She appears well-developed and well-nourished. She is active. She does not appear ill. No distress.   HENT:   Right Ear: Tympanic membrane, external ear and ear canal normal.   Left Ear: Tympanic membrane, external ear and ear canal normal.   Mouth/Throat: No oral lesions. No oropharyngeal exudate, posterior oropharyngeal edema or posterior oropharyngeal erythema.   Eyes: Conjunctivae are normal.   Cardiovascular: Normal rate, regular rhythm, S1 normal and S2 normal.  Exam reveals no S3 and no S4.    No murmur heard.  Pulmonary/Chest: No tachypnea. No respiratory distress. She has no decreased breath sounds. She has no wheezes. She has no rhonchi. She has no rales.   Abdominal: Soft. Normal appearance and bowel sounds are normal. She exhibits no abdominal bruit and no mass. There is no hepatosplenomegaly. There is no tenderness.   Lymphadenopathy:   Left parotid gland is now normal sized and minimally tender.   Neurological: She is alert and oriented to person, place, and time. Gait normal.   Psychiatric: She has a normal mood and affect. Her speech is normal and  behavior is normal. Judgment and thought content normal. Cognition and memory are normal.         Assessment/Plan assessment #1 acute left parotiditis-nearly completely resolved.    Plan #1 extend Augmentin 875 mg twice a day to 10 full days.  She will call for recurrence of symptoms.

## 2018-07-11 RX ORDER — BISOPROLOL FUMARATE AND HYDROCHLOROTHIAZIDE 5; 6.25 MG/1; MG/1
1 TABLET ORAL DAILY
Qty: 90 TABLET | Refills: 1 | OUTPATIENT
Start: 2018-07-11

## 2018-07-16 ENCOUNTER — TELEPHONE (OUTPATIENT)
Dept: INTERNAL MEDICINE | Facility: CLINIC | Age: 58
End: 2018-07-16

## 2018-07-16 NOTE — TELEPHONE ENCOUNTER
----- Message from Kamila Palacio sent at 7/16/2018 12:17 PM EDT -----  Contact: Patients   Patients  calling states wife's mother had a stroke and is passing away and would like to know if she can get something for her nerves. Please advise    Patients ([place]):[phone]    Pharmacy:[facility]/pharmacy #[id number] - [place], [place] - [address] AT [facility] - [phone]  - [phone] FX

## 2018-07-25 RX ORDER — BISOPROLOL FUMARATE AND HYDROCHLOROTHIAZIDE 5; 6.25 MG/1; MG/1
1 TABLET ORAL DAILY
Qty: 90 TABLET | Refills: 1 | OUTPATIENT
Start: 2018-07-25

## 2018-08-03 ENCOUNTER — TELEPHONE (OUTPATIENT)
Dept: INTERNAL MEDICINE | Facility: CLINIC | Age: 58
End: 2018-08-03

## 2018-08-03 DIAGNOSIS — I10 ESSENTIAL HYPERTENSION: Primary | ICD-10-CM

## 2018-08-03 RX ORDER — BISOPROLOL FUMARATE AND HYDROCHLOROTHIAZIDE 5; 6.25 MG/1; MG/1
1 TABLET ORAL DAILY
Qty: 90 TABLET | Refills: 3 | Status: SHIPPED | OUTPATIENT
Start: 2018-08-03

## 2018-08-03 NOTE — TELEPHONE ENCOUNTER
----- Message from Berna Caputo sent at 8/3/2018 12:50 PM EDT -----  Contact: [place], spouse - Dr Meyers's pt - RE: Rx refill  [place], spouse calling and would like a refill on pt'sRx      bisoprolol-hydrochlorothiazide (ZIAC) 5-6.25 MG per tablet 90 tablet 3    Sig - Route: Take 1 tablet by mouth Daily. - Oral     [id number] - [place], [place] - [address] AT [facility] - [phone]  - [phone] FX    [place], spouse  # [phone]

## 2018-09-04 RX ORDER — MONTELUKAST SODIUM 10 MG/1
TABLET ORAL
Qty: 30 TABLET | Refills: 4 | Status: SHIPPED | OUTPATIENT
Start: 2018-09-04

## 2018-09-06 RX ORDER — MONTELUKAST SODIUM 10 MG/1
TABLET ORAL
Qty: 30 TABLET | Refills: 4 | Status: SHIPPED | OUTPATIENT
Start: 2018-09-06

## 2019-01-12 DIAGNOSIS — K11.20 PAROTITIS: ICD-10-CM

## 2019-01-14 RX ORDER — FLUCONAZOLE 150 MG/1
TABLET ORAL
Qty: 2 TABLET | Refills: 1 | OUTPATIENT
Start: 2019-01-14

## 2019-07-22 NOTE — PROGRESS NOTES
Patient Name: Liliya Robison  :1960  Age: 58 y.o.  Primary Cardiologist: Luisa Grubbs MD  Encounter Provider:  RUY Tadeo      Chief Complaint:   Chief Complaint   Patient presents with   • Hypertension     1  YR FOLLOW UP         HPI this is a 58-year-old female, new to this provider, who comes today for annual follow-up regarding history of hyperlipidemia, FELIPE, hypertension, DM 2.  Last echo in May 2016 revealed all LV wall segments with normal contraction, mild MR, EF 67.2%, with no evidence of pericardial effusion.  Stress testing done at the same time revealed no evidence of myocardial ischemia.  No recent lab work available for review.  Patient is currently complaining of palpitations that has at least once led to her feeling as though she were going to pass out.  She is without chest pain, shortness of breath, and weakness.  She does complain of being more fatigued than normal as well.    Patient Active Problem List   Diagnosis   • Hyperlipidemia   • Obstructive sleep apnea on CPAP   • NAFLD (nonalcoholic fatty liver disease)   • Type 2 diabetes mellitus without complication (CMS/HCC)   • History of renal stone   • Cervical radiculopathy due to degenerative joint disease of spine   • Hypertension   • Gastroesophageal reflux disease without esophagitis   • Environmental allergies   • Chronic anxiety   • Chronic depression   • Migraine without status migrainosus, not intractable   • Carpal tunnel syndrome   • Irritable bowel syndrome   • Parotiditis   • Near syncope           The following portions of the patient's history were reviewed and updated as appropriate: allergies, current medications, past family history, past medical history, past social history, past surgical history and problem list.    Current Outpatient Medications on File Prior to Visit   Medication Sig Dispense Refill   • aspirin-acetaminophen-caffeine (EXCEDRIN MIGRAINE) 250-250-65 MG per tablet Take 2  tablets by mouth Daily. 60 tablet 1   • bisoprolol-hydrochlorothiazide (ZIAC) 5-6.25 MG per tablet Take 1 tablet by mouth Daily. 90 tablet 3   • citalopram (CeleXA) 40 MG tablet TAKE 1 TABLET BY MOUTH DAILY. 90 tablet 3   • fluticasone (FLONASE) 50 MCG/ACT nasal spray 1 spray into each nostril 2 (Two) Times a Day.     • furosemide (LASIX) 40 MG tablet TAKE 1 TABLET BY MOUTH DAILY. (Patient taking differently: TAKE 1 TABLET BY MOUTH DAILY. PRN) 90 tablet 3   • Insulin Degludec 200 UNIT/ML solution pen-injector Inject 68 Units under the skin 2 (Two) Times a Day. 9 pen 2   • Insulin Glargine (LANTUS SOLOSTAR) 100 UNIT/ML injection pen Inject 80 Units under the skin into the appropriate area as directed.     • lisinopril (PRINIVIL,ZESTRIL) 10 MG tablet TAKE 1 TABLET BY MOUTH DAILY. 90 tablet 2   • metFORMIN ER (GLUCOPHAGE-XR) 500 MG 24 hr tablet      • montelukast (SINGULAIR) 10 MG tablet TAKE 1 TABLET BY MOUTH EVERY NIGHT. 30 tablet 4   • Multiple Vitamins-Minerals (ALIVE ONCE DAILY WOMENS 50+ PO) Take 1 tablet/day by mouth daily.     • naproxen (NAPROSYN) 500 MG tablet Take 1 tablet by mouth 2 (Two) Times a Day With Meals. 30 tablet 0   • potassium chloride (KLOR-CON) 10 MEQ CR tablet Take 1 tablet by mouth 2 (Two) Times a Day. 60 tablet 11   • promethazine (PHENERGAN) 25 MG tablet Take 1 tablet by mouth Every 6 (Six) Hours As Needed for Nausea or Vomiting. 40 tablet 0   • PROVENTIL  (90 Base) MCG/ACT inhaler INHALE 2 PUFFS EVERY 6 (SIX) HOURS AS NEEDED FOR SHORTNESS OF AIR. 2 PUFFS EVERY 4 HOURS AS NEEDED 13.4 inhaler 3   • aspirin 81 MG EC tablet Take 81 mg by mouth daily.     • buPROPion XL (WELLBUTRIN XL) 150 MG 24 hr tablet TAKE 1 TABLET BY MOUTH DAILY. 90 tablet 3   • cyclobenzaprine (FLEXERIL) 10 MG tablet Take 1 tablet by mouth 3 (Three) Times a Day As Needed for Muscle Spasms (if too sedating can break tablet in 1/2). 20 tablet 0   • Estradiol 0.75 MG/1.25 GM (0.06%) topical gel Place  on the skin  daily.     • ezetimibe (ZETIA) 10 MG tablet TAKE 1 TABLET BY MOUTH DAILY. 90 tablet 1   • fluconazole (DIFLUCAN) 150 MG tablet Take one pill once, if needed repeat in 4 days. 2 tablet 1   • insulin aspart (NOVOLOG FLEXPEN) 100 UNIT/ML solution pen-injector sc pen IN JECT 35 UNITS IN THE MORNING 50 UNITS AT LUNCH AND 54 UNITS AT DINNER 27 pen 2   • Insulin Pen Needle (PEN NEEDLES) 31G X 5 MM misc 1 each 5 (Five) Times a Day. 500 each 2   • montelukast (SINGULAIR) 10 MG tablet Take 1 tablet by mouth Every Night. 30 tablet 11   • montelukast (SINGULAIR) 10 MG tablet TAKE 1 TABLET BY MOUTH EVERY NIGHT. 30 tablet 4   • omeprazole (priLOSEC) 40 MG capsule TAKE 1 CAPSULE BY MOUTH DAILY. 90 capsule 3   • ONE TOUCH ULTRA TEST test strip TEST TWICE DAILY 200 each 0   • ONETOUCH DELICA LANCETS 33G misc USING 3 DAILY 300 each 1   • PHARMACIST CHOICE LANCETS misc      • Probiotic Product (PROBIOTIC DAILY PO) Take 1 capsule by mouth daily.     • progesterone (PROMETRIUM) 100 MG capsule 100 mg. TAKE ONE CAPSULE BY MOUTH AT BEDTIME  4     No current facility-administered medications on file prior to visit.        Past Medical History:   Diagnosis Date   • Hyperlipidemia    • Hypertension    • NAFLD (nonalcoholic fatty liver disease)    • Type 2 diabetes mellitus (CMS/HCC)        Past Surgical History:   Procedure Laterality Date   • ANKLE SURGERY Left    • CARPAL TUNNEL RELEASE Right     JOINT REPLACEMENT    •  SECTION      X3   • COLONOSCOPY     • HYSTERECTOMY     • TUBAL ABDOMINAL LIGATION     • UPPER GASTROINTESTINAL ENDOSCOPY         Family History   Problem Relation Age of Onset   • Hypertension Mother    • Stroke Mother    • Atrial fibrillation Father    • Kidney disease Father    • Hypertension Father    • Kidney disease Sister    • Hyperlipidemia Sister    • Hypertension Sister    • Hypertension Brother    • Hyperlipidemia Brother    • Diabetes Brother        Social History     Socioeconomic History   • Marital  "status:      Spouse name: Not on file   • Number of children: Not on file   • Years of education: Not on file   • Highest education level: Not on file   Tobacco Use   • Smoking status: Former Smoker     Packs/day: 2.00   • Smokeless tobacco: Former User   • Tobacco comment: D/C 3 YRS AGO   Substance and Sexual Activity   • Alcohol use: No   • Drug use: No   • Sexual activity: Defer       Review of Systems   Constitution: Positive for malaise/fatigue. Negative for diaphoresis and weakness.   HENT: Negative for nosebleeds and stridor.    Cardiovascular: Positive for irregular heartbeat and near-syncope. Negative for chest pain, claudication, dyspnea on exertion, leg swelling, orthopnea, palpitations, paroxysmal nocturnal dyspnea and syncope.   Respiratory: Negative for cough, hemoptysis, shortness of breath and wheezing.    Gastrointestinal: Negative for abdominal pain, constipation, diarrhea, hematemesis, hematochezia, melena, nausea and vomiting.   Neurological: Positive for dizziness and light-headedness. Negative for focal weakness.       OBJECTIVE:   Vital Signs  Vitals:    07/23/19 0918   BP: 102/66   Pulse: 66     Estimated body mass index is 41.16 kg/m² as calculated from the following:    Height as of this encounter: 167.6 cm (66\").    Weight as of this encounter: 116 kg (255 lb).    Physical Exam   Constitutional: She is oriented to person, place, and time. She appears well-developed and well-nourished. No distress.   HENT:   Head: Normocephalic and atraumatic.   Eyes: Conjunctivae and EOM are normal. Pupils are equal, round, and reactive to light.   Neck: Normal range of motion. Neck supple. No JVD present. No tracheal deviation present. No thyromegaly present.   Cardiovascular: Normal rate, regular rhythm, normal heart sounds and intact distal pulses. Exam reveals no gallop and no friction rub.   No murmur heard.  Pulmonary/Chest: Effort normal and breath sounds normal. No respiratory distress. She " has no wheezes. She has no rales. She exhibits no tenderness.   Abdominal: Soft. Bowel sounds are normal. She exhibits no distension. There is no tenderness.   Musculoskeletal: Normal range of motion. She exhibits no edema, tenderness or deformity.   Neurological: She is alert and oriented to person, place, and time.   Skin: Skin is warm and dry. No rash noted. She is not diaphoretic. No erythema. No pallor.   Psychiatric: She has a normal mood and affect. Her behavior is normal.         ECG 12 Lead  Date/Time: 2019 9:35 AM  Performed by: Kamila Cardenas APRN  Authorized by: Kamila Cardenas APRN   Comparison: compared with previous ECG   Similar to previous ECG  Rhythm: sinus rhythm  Rate: normal  Conduction: conduction normal              Stress Testin2016  Interpretation Summary     · Findings consistent with a normal ECG stress test.  · Breast attenuation artifact is present.  · Left ventricular ejection fraction is normal (Calculated EF = 65%).  · Myocardial perfusion imaging indicates a normal myocardial perfusion study with no evidence of ischemia.  · Impressions are consistent with a low risk study.         Cardiac Echo:  2016  Interpretation Summary     · All left ventricular wall segments contract normally.  · Mild mitral valve regurgitation is present  · Left Ventricle: Calculated EF = 67.2%  · There is no evidence of pericardial effusion  · Mild mitral valve regurgitation is present           ASSESSMENT:      Diagnosis Plan   1. Palpitations  Cardiac Event Monitor   2. Essential hypertension  ECG 12 Lead    Stress Test With Myocardial Perfusion One Day    Adult Transthoracic Echo Complete W/ Cont if Necessary Per Protocol    Lipid Panel    Comprehensive Metabolic Panel    Cardiac Event Monitor   3. Chest pain, atypical  Stress Test With Myocardial Perfusion One Day    Adult Transthoracic Echo Complete W/ Cont if Necessary Per Protocol    Lipid Panel    Comprehensive Metabolic Panel     Cardiac Event Monitor   4. SOB (shortness of breath)  Stress Test With Myocardial Perfusion One Day    Adult Transthoracic Echo Complete W/ Cont if Necessary Per Protocol    Lipid Panel    Comprehensive Metabolic Panel    Cardiac Event Monitor   5. Near syncope           PLAN OF CARE:     1.  Hypertension-blood pressure in office today is currently controlled.  Patient currently on Ziac and lisinopril.  Continue current medication regimen.    Importance of controlling hypertension and blood pressure  checkup on the regular basis has been explained  Hypertension as a silent killer has been discussed  Risk reduction of the weight and regular exercises to control the hypertension has been explained      2.  Dyslipidemia-patient currently on Zetia.  No recent lipid panel or LFTs available for review.  Will obtain fasting lipid panel and CMP.    Risk of the hyperlipidemia, importance of the treatment has been explained  Pros and cons of the statins has been explained  Regular blood workup as well as side effects including the liver failure, myelopathy death has been explained    3.  FELIPE- patient follows with pulmonary, per patient report she no longer needs CPAP.    4.  Near syncope- patient has intermittent palpitations that are at times accompanied by dizziness, lightheadedness, and near syncope.  She is also more fatigued than normal.  Will obtain updated echo and stress test, 2-week ZIO, and have the patient follow-up in 4 to 6 weeks.    It was explained to the patient that stress testing carries 85% specificity/sensitivity, and does not rule out future cardiac event.  Risks of the procedure were explained to the patient including shortness of breath, induction of myocardial infarction, and dizziness.  Patient is agreeable to proceeding with stress testing.       Stress, echo, 2 weeks ago, lipid panel, CMP, follow-up with Dr. Grubbs in 4 to 6 weeks or sooner if needed.      Sincerely,   Kamila Cardenas,  RUY  [facility]  07/23/19  10:41 AM

## 2019-07-23 ENCOUNTER — OFFICE VISIT (OUTPATIENT)
Dept: CARDIOLOGY | Facility: CLINIC | Age: 59
End: 2019-07-23

## 2019-07-23 VITALS
DIASTOLIC BLOOD PRESSURE: 66 MMHG | WEIGHT: 255 LBS | HEIGHT: 66 IN | SYSTOLIC BLOOD PRESSURE: 102 MMHG | BODY MASS INDEX: 40.98 KG/M2 | HEART RATE: 66 BPM

## 2019-07-23 DIAGNOSIS — R07.89 CHEST PAIN, ATYPICAL: ICD-10-CM

## 2019-07-23 DIAGNOSIS — R00.2 PALPITATIONS: Primary | ICD-10-CM

## 2019-07-23 DIAGNOSIS — R06.02 SOB (SHORTNESS OF BREATH): ICD-10-CM

## 2019-07-23 DIAGNOSIS — I10 ESSENTIAL HYPERTENSION: ICD-10-CM

## 2019-07-23 DIAGNOSIS — R55 NEAR SYNCOPE: ICD-10-CM

## 2019-07-23 PROCEDURE — 93000 ELECTROCARDIOGRAM COMPLETE: CPT | Performed by: NURSE PRACTITIONER

## 2019-07-23 PROCEDURE — 99214 OFFICE O/P EST MOD 30 MIN: CPT | Performed by: NURSE PRACTITIONER

## 2019-08-20 ENCOUNTER — HOSPITAL ENCOUNTER (OUTPATIENT)
Dept: CARDIOLOGY | Facility: HOSPITAL | Age: 59
Discharge: HOME | End: 2019-08-20

## 2019-08-20 ENCOUNTER — HOSPITAL ENCOUNTER (OUTPATIENT)
Dept: CARDIOLOGY | Facility: HOSPITAL | Age: 59
End: 2019-08-20

## 2019-08-20 ENCOUNTER — HOSPITAL ENCOUNTER (OUTPATIENT)
Dept: CARDIOLOGY | Facility: HOSPITAL | Age: 59
Discharge: HOME | End: 2019-08-20
Admitting: INTERNAL MEDICINE

## 2019-08-20 VITALS
SYSTOLIC BLOOD PRESSURE: 88 MMHG | OXYGEN SATURATION: 98 % | DIASTOLIC BLOOD PRESSURE: 60 MMHG | HEIGHT: 66 IN | BODY MASS INDEX: 40.82 KG/M2 | WEIGHT: 254 LBS | HEART RATE: 65 BPM

## 2019-08-20 LAB
AORTIC DIMENSIONLESS INDEX: 0.7 (DI)
BH CV ECHO MEAS - AO MAX PG (FULL): 5.5 MMHG
BH CV ECHO MEAS - AO MAX PG: 9.7 MMHG
BH CV ECHO MEAS - AO MEAN PG (FULL): 3 MMHG
BH CV ECHO MEAS - AO MEAN PG: 5 MMHG
BH CV ECHO MEAS - AO ROOT AREA (BSA CORRECTED): 0.99
BH CV ECHO MEAS - AO ROOT AREA: 3.8 CM^2
BH CV ECHO MEAS - AO ROOT DIAM: 2.2 CM
BH CV ECHO MEAS - AO V2 MAX: 156 CM/SEC
BH CV ECHO MEAS - AO V2 MEAN: 109 CM/SEC
BH CV ECHO MEAS - AO V2 VTI: 32.4 CM
BH CV ECHO MEAS - AVA(I,A): 2.5 CM^2
BH CV ECHO MEAS - AVA(I,D): 2.5 CM^2
BH CV ECHO MEAS - AVA(V,A): 2.3 CM^2
BH CV ECHO MEAS - AVA(V,D): 2.3 CM^2
BH CV ECHO MEAS - BSA(HAYCOCK): 2.4 M^2
BH CV ECHO MEAS - BSA: 2.2 M^2
BH CV ECHO MEAS - BZI_BMI: 41.2 KILOGRAMS/M^2
BH CV ECHO MEAS - BZI_METRIC_HEIGHT: 167.6 CM
BH CV ECHO MEAS - BZI_METRIC_WEIGHT: 115.7 KG
BH CV ECHO MEAS - EDV(CUBED): 132.7 ML
BH CV ECHO MEAS - EDV(MOD-SP2): 118 ML
BH CV ECHO MEAS - EDV(MOD-SP4): 146 ML
BH CV ECHO MEAS - EDV(TEICH): 123.8 ML
BH CV ECHO MEAS - EF(CUBED): 70.4 %
BH CV ECHO MEAS - EF(MOD-BP): 66 %
BH CV ECHO MEAS - EF(MOD-SP2): 65.3 %
BH CV ECHO MEAS - EF(MOD-SP4): 63.7 %
BH CV ECHO MEAS - EF(TEICH): 61.7 %
BH CV ECHO MEAS - ESV(CUBED): 39.3 ML
BH CV ECHO MEAS - ESV(MOD-SP2): 41 ML
BH CV ECHO MEAS - ESV(MOD-SP4): 53 ML
BH CV ECHO MEAS - ESV(TEICH): 47.4 ML
BH CV ECHO MEAS - FS: 33.3 %
BH CV ECHO MEAS - IVS/LVPW: 0.89
BH CV ECHO MEAS - IVSD: 0.8 CM
BH CV ECHO MEAS - LAT PEAK E' VEL: 11 CM/SEC
BH CV ECHO MEAS - LV DIASTOLIC VOL/BSA (35-75): 65.8 ML/M^2
BH CV ECHO MEAS - LV MASS(C)D: 151.8 GRAMS
BH CV ECHO MEAS - LV MASS(C)DI: 68.5 GRAMS/M^2
BH CV ECHO MEAS - LV MAX PG: 4.2 MMHG
BH CV ECHO MEAS - LV MEAN PG: 2 MMHG
BH CV ECHO MEAS - LV SYSTOLIC VOL/BSA (12-30): 23.9 ML/M^2
BH CV ECHO MEAS - LV V1 MAX: 103 CM/SEC
BH CV ECHO MEAS - LV V1 MEAN: 73.8 CM/SEC
BH CV ECHO MEAS - LV V1 VTI: 23.4 CM
BH CV ECHO MEAS - LVIDD: 5.1 CM
BH CV ECHO MEAS - LVIDS: 3.4 CM
BH CV ECHO MEAS - LVLD AP2: 8 CM
BH CV ECHO MEAS - LVLD AP4: 8.6 CM
BH CV ECHO MEAS - LVLS AP2: 6.5 CM
BH CV ECHO MEAS - LVLS AP4: 6.5 CM
BH CV ECHO MEAS - LVOT AREA (M): 3.5 CM^2
BH CV ECHO MEAS - LVOT AREA: 3.5 CM^2
BH CV ECHO MEAS - LVOT DIAM: 2.1 CM
BH CV ECHO MEAS - LVPWD: 0.9 CM
BH CV ECHO MEAS - MED PEAK E' VEL: 6 CM/SEC
BH CV ECHO MEAS - MV A DUR: 0.1 SEC
BH CV ECHO MEAS - MV A MAX VEL: 65 CM/SEC
BH CV ECHO MEAS - MV DEC SLOPE: 216 CM/SEC^2
BH CV ECHO MEAS - MV DEC TIME: 225 SEC
BH CV ECHO MEAS - MV E MAX VEL: 76.6 CM/SEC
BH CV ECHO MEAS - MV E/A: 1.2
BH CV ECHO MEAS - MV MAX PG: 2.3 MMHG
BH CV ECHO MEAS - MV MEAN PG: 1 MMHG
BH CV ECHO MEAS - MV P1/2T MAX VEL: 71.9 CM/SEC
BH CV ECHO MEAS - MV P1/2T: 97.5 MSEC
BH CV ECHO MEAS - MV V2 MAX: 76.1 CM/SEC
BH CV ECHO MEAS - MV V2 MEAN: 49.4 CM/SEC
BH CV ECHO MEAS - MV V2 VTI: 24.5 CM
BH CV ECHO MEAS - MVA P1/2T LCG: 3.1 CM^2
BH CV ECHO MEAS - MVA(P1/2T): 2.3 CM^2
BH CV ECHO MEAS - MVA(VTI): 3.3 CM^2
BH CV ECHO MEAS - PA ACC TIME: 0.06 SEC
BH CV ECHO MEAS - PA MAX PG (FULL): 3 MMHG
BH CV ECHO MEAS - PA MAX PG: 4.8 MMHG
BH CV ECHO MEAS - PA PR(ACCEL): 53.8 MMHG
BH CV ECHO MEAS - PA V2 MAX: 110 CM/SEC
BH CV ECHO MEAS - RAP SYSTOLE: 3 MMHG
BH CV ECHO MEAS - RV MAX PG: 1.8 MMHG
BH CV ECHO MEAS - RV MEAN PG: 1 MMHG
BH CV ECHO MEAS - RV V1 MAX: 68 CM/SEC
BH CV ECHO MEAS - RV V1 MEAN: 47.4 CM/SEC
BH CV ECHO MEAS - RV V1 VTI: 12 CM
BH CV ECHO MEAS - SI(AO): 55.5 ML/M^2
BH CV ECHO MEAS - SI(CUBED): 42.1 ML/M^2
BH CV ECHO MEAS - SI(LVOT): 36.5 ML/M^2
BH CV ECHO MEAS - SI(MOD-SP2): 34.7 ML/M^2
BH CV ECHO MEAS - SI(MOD-SP4): 41.9 ML/M^2
BH CV ECHO MEAS - SI(TEICH): 34.4 ML/M^2
BH CV ECHO MEAS - SV(AO): 123.2 ML
BH CV ECHO MEAS - SV(CUBED): 93.3 ML
BH CV ECHO MEAS - SV(LVOT): 81 ML
BH CV ECHO MEAS - SV(MOD-SP2): 77 ML
BH CV ECHO MEAS - SV(MOD-SP4): 93 ML
BH CV ECHO MEAS - SV(TEICH): 76.4 ML
BH CV ECHO MEAS - TAPSE (>1.6): 2.3 CM
BH CV ECHO MEASUREMENTS AVERAGE E/E' RATIO: 9.01
BH CV XLRA - RV BASE: 2.9 CM
BH CV XLRA - TDI S': 12 CM/SEC
LEFT ATRIUM VOLUME INDEX: 16 ML/M2
MAXIMAL PREDICTED HEART RATE: 161 BPM
STRESS TARGET HR: 137 BPM

## 2019-08-20 PROCEDURE — 93306 TTE W/DOPPLER COMPLETE: CPT

## 2019-08-20 PROCEDURE — 93306 TTE W/DOPPLER COMPLETE: CPT | Performed by: INTERNAL MEDICINE

## 2019-08-20 PROCEDURE — 25010000002 PERFLUTREN (DEFINITY) 8.476 MG IN SODIUM CHLORIDE 0.9 % 10 ML INJECTION: Performed by: INTERNAL MEDICINE

## 2019-08-20 PROCEDURE — 25010000002 REGADENOSON 0.4 MG/5ML SOLUTION: Performed by: INTERNAL MEDICINE

## 2019-08-20 PROCEDURE — 93016 CV STRESS TEST SUPVJ ONLY: CPT | Performed by: NURSE PRACTITIONER

## 2019-08-20 PROCEDURE — 78452 HT MUSCLE IMAGE SPECT MULT: CPT | Performed by: INTERNAL MEDICINE

## 2019-08-20 PROCEDURE — 93018 CV STRESS TEST I&R ONLY: CPT | Performed by: INTERNAL MEDICINE

## 2019-08-20 PROCEDURE — A9500 TC99M SESTAMIBI: HCPCS | Performed by: INTERNAL MEDICINE

## 2019-08-20 PROCEDURE — 78452 HT MUSCLE IMAGE SPECT MULT: CPT

## 2019-08-20 PROCEDURE — 0 TECHNETIUM SESTAMIBI: Performed by: INTERNAL MEDICINE

## 2019-08-20 PROCEDURE — 93017 CV STRESS TEST TRACING ONLY: CPT

## 2019-08-20 RX ORDER — RANITIDINE 300 MG/1
300 TABLET ORAL
Refills: 5 | COMMUNITY
Start: 2019-07-18

## 2019-08-20 RX ORDER — INSULIN LISPRO 100 U/ML
INJECTION, SOLUTION SUBCUTANEOUS
Refills: 3 | COMMUNITY
Start: 2019-07-22

## 2019-08-20 RX ORDER — CONJUGATED ESTROGENS 0.62 MG/G
CREAM VAGINAL
Refills: 1 | COMMUNITY
Start: 2019-08-01

## 2019-08-20 RX ADMIN — TECHNETIUM TC 99M SESTAMIBI 1 DOSE: 1 INJECTION INTRAVENOUS at 09:18

## 2019-08-20 RX ADMIN — SODIUM CHLORIDE 3 ML: 9 INJECTION INTRAMUSCULAR; INTRAVENOUS; SUBCUTANEOUS at 07:35

## 2019-08-20 RX ADMIN — REGADENOSON 0.4 MG: 0.08 INJECTION, SOLUTION INTRAVENOUS at 09:18

## 2019-08-20 RX ADMIN — TECHNETIUM TC 99M SESTAMIBI 1 DOSE: 1 INJECTION INTRAVENOUS at 07:12

## 2019-08-22 LAB
BH CV STRESS BP STAGE 1: NORMAL
BH CV STRESS BP STAGE 2: NORMAL
BH CV STRESS COMMENTS STAGE 1: NORMAL
BH CV STRESS DOSE REGADENOSON STAGE 1: 0.4
BH CV STRESS DURATION MIN STAGE 1: 1
BH CV STRESS DURATION MIN STAGE 2: 2
BH CV STRESS DURATION SEC STAGE 1: 38
BH CV STRESS DURATION SEC STAGE 2: 4
BH CV STRESS GRADE STAGE 1: 3
BH CV STRESS GRADE STAGE 2: 3
BH CV STRESS HR STAGE 1: 85
BH CV STRESS HR STAGE 2: 103
BH CV STRESS METS STAGE 1: 2.2
BH CV STRESS METS STAGE 2: 3.4
BH CV STRESS PROTOCOL 1: NORMAL
BH CV STRESS RECOVERY BP: NORMAL MMHG
BH CV STRESS RECOVERY HR: 79 BPM
BH CV STRESS RECOVERY O2: 98 %
BH CV STRESS SPEED STAGE 1: 1.7
BH CV STRESS SPEED STAGE 2: 2.2
BH CV STRESS STAGE 1: 1
BH CV STRESS STAGE 2: 2
LV EF NUC BP: 60 %
MAXIMAL PREDICTED HEART RATE: 161 BPM
PERCENT MAX PREDICTED HR: 63.98 %
STRESS BASELINE BP: NORMAL MMHG
STRESS BASELINE HR: 63 BPM
STRESS O2 SAT REST: 98 %
STRESS PERCENT HR: 75 %
STRESS POST ESTIMATED WORKLOAD: 3.4 METS
STRESS POST EXERCISE DUR MIN: 3 MIN
STRESS POST EXERCISE DUR SEC: 42 SEC
STRESS POST PEAK BP: NORMAL MMHG
STRESS POST PEAK HR: 103 BPM
STRESS TARGET HR: 137 BPM

## 2019-08-29 ENCOUNTER — OFFICE VISIT (OUTPATIENT)
Dept: CARDIOLOGY | Facility: CLINIC | Age: 59
End: 2019-08-29

## 2019-08-29 VITALS
DIASTOLIC BLOOD PRESSURE: 69 MMHG | HEART RATE: 65 BPM | HEIGHT: 66 IN | SYSTOLIC BLOOD PRESSURE: 165 MMHG | BODY MASS INDEX: 41 KG/M2

## 2019-08-29 DIAGNOSIS — E78.5 HYPERLIPIDEMIA, UNSPECIFIED HYPERLIPIDEMIA TYPE: ICD-10-CM

## 2019-08-29 DIAGNOSIS — R00.2 PALPITATIONS: ICD-10-CM

## 2019-08-29 DIAGNOSIS — R55 NEAR SYNCOPE: Primary | ICD-10-CM

## 2019-08-29 DIAGNOSIS — I10 ESSENTIAL HYPERTENSION: ICD-10-CM

## 2019-08-29 PROCEDURE — 99213 OFFICE O/P EST LOW 20 MIN: CPT | Performed by: INTERNAL MEDICINE

## 2020-03-13 ENCOUNTER — APPOINTMENT (OUTPATIENT)
Dept: WOMENS IMAGING | Facility: HOSPITAL | Age: 60
End: 2020-03-13

## 2020-03-13 PROCEDURE — 77067 SCR MAMMO BI INCL CAD: CPT | Performed by: RADIOLOGY

## 2020-03-13 PROCEDURE — 77063 BREAST TOMOSYNTHESIS BI: CPT | Performed by: RADIOLOGY

## 2021-03-19 ENCOUNTER — BULK ORDERING (OUTPATIENT)
Dept: CASE MANAGEMENT | Facility: OTHER | Age: 61
End: 2021-03-19

## 2021-03-19 DIAGNOSIS — Z23 IMMUNIZATION DUE: ICD-10-CM
